# Patient Record
Sex: MALE | Race: WHITE | Employment: FULL TIME | URBAN - METROPOLITAN AREA
[De-identification: names, ages, dates, MRNs, and addresses within clinical notes are randomized per-mention and may not be internally consistent; named-entity substitution may affect disease eponyms.]

---

## 2017-01-09 ENCOUNTER — OFFICE VISIT (OUTPATIENT)
Dept: FAMILY MEDICINE CLINIC | Age: 22
End: 2017-01-09

## 2017-01-09 VITALS — WEIGHT: 143 LBS | BODY MASS INDEX: 19.39 KG/M2 | DIASTOLIC BLOOD PRESSURE: 70 MMHG | SYSTOLIC BLOOD PRESSURE: 110 MMHG

## 2017-01-09 DIAGNOSIS — Z23 NEED FOR INFLUENZA VACCINATION: ICD-10-CM

## 2017-01-09 DIAGNOSIS — M75.80 ROTATOR CUFF TENDONITIS, UNSPECIFIED LATERALITY: Primary | ICD-10-CM

## 2017-01-09 DIAGNOSIS — Z00.00 PREVENTATIVE HEALTH CARE: ICD-10-CM

## 2017-01-09 PROCEDURE — 99213 OFFICE O/P EST LOW 20 MIN: CPT | Performed by: FAMILY MEDICINE

## 2017-01-09 RX ORDER — DICLOFENAC SODIUM 75 MG/1
75 TABLET, DELAYED RELEASE ORAL 2 TIMES DAILY
Qty: 60 TABLET | Refills: 1 | Status: SHIPPED | OUTPATIENT
Start: 2017-01-09 | End: 2017-04-25

## 2017-04-25 ENCOUNTER — OFFICE VISIT (OUTPATIENT)
Dept: FAMILY MEDICINE CLINIC | Age: 22
End: 2017-04-25

## 2017-04-25 VITALS
WEIGHT: 145 LBS | HEIGHT: 72 IN | BODY MASS INDEX: 19.64 KG/M2 | DIASTOLIC BLOOD PRESSURE: 78 MMHG | SYSTOLIC BLOOD PRESSURE: 118 MMHG

## 2017-04-25 DIAGNOSIS — D22.9 MULTIPLE BENIGN NEVI: ICD-10-CM

## 2017-04-25 DIAGNOSIS — M22.2X1 RIGHT PATELLOFEMORAL SYNDROME: Primary | ICD-10-CM

## 2017-04-25 PROCEDURE — 99213 OFFICE O/P EST LOW 20 MIN: CPT | Performed by: FAMILY MEDICINE

## 2017-04-25 RX ORDER — DICLOFENAC SODIUM 75 MG/1
75 TABLET, DELAYED RELEASE ORAL 2 TIMES DAILY
Qty: 60 TABLET | Refills: 1 | Status: SHIPPED | OUTPATIENT
Start: 2017-04-25

## 2017-05-25 ENCOUNTER — TELEPHONE (OUTPATIENT)
Dept: FAMILY MEDICINE CLINIC | Age: 22
End: 2017-05-25

## 2017-05-25 RX ORDER — DIAZEPAM 2 MG/1
TABLET ORAL
Qty: 2 TABLET | Refills: 0 | Status: SHIPPED | OUTPATIENT
Start: 2017-05-25

## 2017-06-02 ENCOUNTER — PROCEDURE VISIT (OUTPATIENT)
Dept: DERMATOLOGY | Age: 22
End: 2017-06-02

## 2017-06-02 DIAGNOSIS — D22.4 IRRITATED NEVUS OF SCALP: Primary | ICD-10-CM

## 2017-06-02 PROCEDURE — 11306 SHAVE SKIN LESION 0.6-1.0 CM: CPT | Performed by: DERMATOLOGY

## 2017-06-12 ENCOUNTER — TELEPHONE (OUTPATIENT)
Dept: DERMATOLOGY | Age: 22
End: 2017-06-12

## 2017-07-11 ENCOUNTER — TELEPHONE (OUTPATIENT)
Dept: FAMILY MEDICINE CLINIC | Age: 22
End: 2017-07-11

## 2017-07-12 ENCOUNTER — TELEPHONE (OUTPATIENT)
Dept: FAMILY MEDICINE CLINIC | Age: 22
End: 2017-07-12

## 2017-07-12 DIAGNOSIS — B35.1 TINEA UNGUIUM: ICD-10-CM

## 2017-07-12 RX ORDER — TERBINAFINE HYDROCHLORIDE 250 MG/1
250 TABLET ORAL DAILY
Qty: 30 TABLET | Refills: 0 | Status: SHIPPED | OUTPATIENT
Start: 2017-07-12 | End: 2018-04-16 | Stop reason: SDUPTHER

## 2018-02-15 ENCOUNTER — OFFICE VISIT (OUTPATIENT)
Dept: FAMILY MEDICINE CLINIC | Age: 23
End: 2018-02-15

## 2018-02-15 ENCOUNTER — HOSPITAL ENCOUNTER (OUTPATIENT)
Dept: OTHER | Age: 23
Discharge: HOME | End: 2018-02-15
Attending: FAMILY MEDICINE | Admitting: FAMILY MEDICINE

## 2018-02-15 VITALS
SYSTOLIC BLOOD PRESSURE: 110 MMHG | BODY MASS INDEX: 19.02 KG/M2 | WEIGHT: 140.4 LBS | DIASTOLIC BLOOD PRESSURE: 70 MMHG | HEIGHT: 72 IN

## 2018-02-15 DIAGNOSIS — G89.29 CHRONIC BILATERAL LOW BACK PAIN WITHOUT SCIATICA: Primary | ICD-10-CM

## 2018-02-15 DIAGNOSIS — G89.29 CHRONIC BILATERAL LOW BACK PAIN WITHOUT SCIATICA: ICD-10-CM

## 2018-02-15 DIAGNOSIS — M54.50 CHRONIC BILATERAL LOW BACK PAIN WITHOUT SCIATICA: ICD-10-CM

## 2018-02-15 DIAGNOSIS — I73.00 RAYNAUD'S DISEASE WITHOUT GANGRENE: ICD-10-CM

## 2018-02-15 DIAGNOSIS — M54.50 CHRONIC BILATERAL LOW BACK PAIN WITHOUT SCIATICA: Primary | ICD-10-CM

## 2018-02-15 PROCEDURE — 99213 OFFICE O/P EST LOW 20 MIN: CPT | Performed by: FAMILY MEDICINE

## 2018-02-15 RX ORDER — AMLODIPINE BESYLATE 2.5 MG/1
2.5 TABLET ORAL DAILY
Qty: 30 TABLET | Refills: 3 | Status: SHIPPED | OUTPATIENT
Start: 2018-02-15

## 2018-02-15 ASSESSMENT — ENCOUNTER SYMPTOMS
ABDOMINAL PAIN: 0
EYE PAIN: 0
WHEEZING: 0
BACK PAIN: 1
SHORTNESS OF BREATH: 0
COUGH: 0

## 2018-02-15 ASSESSMENT — PATIENT HEALTH QUESTIONNAIRE - PHQ9
1. LITTLE INTEREST OR PLEASURE IN DOING THINGS: 0
2. FEELING DOWN, DEPRESSED OR HOPELESS: 0
SUM OF ALL RESPONSES TO PHQ QUESTIONS 1-9: 0
SUM OF ALL RESPONSES TO PHQ9 QUESTIONS 1 & 2: 0

## 2018-02-15 NOTE — PROGRESS NOTES
Subjective:      Patient ID: Guzman Wilson is a 23 y.o. male. HPI  Persistent mid to lower back pain  Onset years  Been nagging issue   no injury   tight muscles   gait ok   no leg pain or numbness  No falls    Also complains hands turn red white and blue when exposed to cold enviroments    Worse winter of holding cold objects   resolves with warmth  Nonsmoker   mod caffeine        Review of Systems   Constitutional: Negative for appetite change, fatigue and unexpected weight change. Eyes: Negative for pain and visual disturbance. Respiratory: Negative for cough, shortness of breath and wheezing. Cardiovascular: Negative for chest pain, palpitations and leg swelling. Gastrointestinal: Negative for abdominal pain. Endocrine: Negative for polyuria. Genitourinary: Negative for difficulty urinating. Musculoskeletal: Positive for back pain. Neurological: Negative for speech difficulty, numbness and headaches. Psychiatric/Behavioral: Negative for confusion and sleep disturbance. A complete 14 point  review of systems was completed; pertinent positives are noted in HPI    Vitals:    02/15/18 0928   BP: 110/70   Weight: 140 lb 6.4 oz (63.7 kg)   Height: 6' (1.829 m)     Body mass index is 19.04 kg/m². Wt Readings from Last 3 Encounters:   02/15/18 140 lb 6.4 oz (63.7 kg)   04/25/17 145 lb (65.8 kg) (33 %, Z= -0.43)*   01/09/17 143 lb (64.9 kg) (31 %, Z= -0.49)*     * Growth percentiles are based on [facility] 2-20 Years data. BP Readings from Last 3 Encounters:   02/15/18 110/70   04/25/17 118/78   01/09/17 110/70        /70   Ht 6' (1.829 m)   Wt 140 lb 6.4 oz (63.7 kg)   BMI 19.04 kg/m²     Objective:   Physical Exam   Constitutional: He is oriented to person, place, and time. No distress. Eyes: Conjunctivae are normal.   Cardiovascular: Normal rate and normal heart sounds. Pulmonary/Chest: Effort normal and breath sounds normal.   Abdominal: Soft.    Musculoskeletal:   General mild paraspinal tender thoracic anc lumbar areas  markedly tight hamstring   neg straight leg   neg sylvia  dtr 2-3/4 bilat extrem  Strength sym 5/5  Gait normal    No digit blanching or cyanosis     Neurological: He is alert and oriented to person, place, and time. Assessment:      Assessment/Plan:  Elyssa Rincon was seen today for back pain. Diagnoses and all orders for this visit:    Chronic bilateral low back pain without sciatica  -     XR LUMBAR SPINE (MIN 4 VIEWS)  -     XR THORACIC SPINE (2 VIEWS)    Raynaud's disease without gangrene  -     amLODIPine (NORVASC) 2.5 MG tablet;  Take 1 tablet by mouth daily            Plan:      Cold enviroment precaujtions   never smoke    Try norvasc  Likely need PT for chronic back pain   no clinical evidenc of cord or nerve root entrapment  Spent 30 minutes with patient and/or family in which greater than half the time was focused on counseling and educating patient and/or  family regarding current health isues

## 2018-02-19 ENCOUNTER — TELEPHONE (OUTPATIENT)
Dept: FAMILY MEDICINE CLINIC | Age: 23
End: 2018-02-19

## 2018-02-19 DIAGNOSIS — M54.6 THORACIC SPINE PAIN: ICD-10-CM

## 2018-02-19 DIAGNOSIS — M54.50 LUMBAR SPINE PAIN: Primary | ICD-10-CM

## 2018-04-16 ENCOUNTER — TELEPHONE (OUTPATIENT)
Dept: FAMILY MEDICINE CLINIC | Age: 23
End: 2018-04-16

## 2018-04-16 DIAGNOSIS — B35.1 TINEA UNGUIUM: ICD-10-CM

## 2018-04-16 RX ORDER — TERBINAFINE HYDROCHLORIDE 250 MG/1
250 TABLET ORAL DAILY
Qty: 30 TABLET | Refills: 0 | Status: SHIPPED | OUTPATIENT
Start: 2018-04-16

## 2019-05-01 ENCOUNTER — TELEPHONE (OUTPATIENT)
Dept: FAMILY MEDICINE CLINIC | Age: 24
End: 2019-05-01

## 2019-05-01 DIAGNOSIS — R42 VERTIGO: Primary | ICD-10-CM

## 2019-05-01 NOTE — TELEPHONE ENCOUNTER
Per Chana Salazar, patient's father on hippa, patient is out in [place] and has been dealing with severe Vertigo issues. States he has been seen by an ENT and had testing/MRI. The ENT recommended for patient to return to [place] and be seen by Neurology until they can figure out what is going on. Kt Saldivar is requesting for a referral to [facility] to be placed. Clarence aware Dr. Patricia Lam is out of the office today. Please return call.

## 2020-04-16 ENCOUNTER — VIRTUAL VISIT (OUTPATIENT)
Dept: FAMILY MEDICINE CLINIC | Age: 25
End: 2020-04-16
Payer: COMMERCIAL

## 2020-04-16 PROCEDURE — 99213 OFFICE O/P EST LOW 20 MIN: CPT | Performed by: FAMILY MEDICINE

## 2020-04-16 RX ORDER — ALBUTEROL SULFATE 90 UG/1
2 AEROSOL, METERED RESPIRATORY (INHALATION) EVERY 6 HOURS PRN
Qty: 1 INHALER | Refills: 3 | Status: SHIPPED | OUTPATIENT
Start: 2020-04-16

## 2020-04-16 ASSESSMENT — ENCOUNTER SYMPTOMS
WHEEZING: 1
SHORTNESS OF BREATH: 1
CHEST TIGHTNESS: 1

## 2020-04-16 NOTE — PROGRESS NOTES
exercise induced asthma lennie in higherr elevations   do not smoke   trial albuterl inhaler  TELEHEALTH EVALUATION -- Audio/Visual (During YFLKT-03 public health emergency)     Pursuant to the emergency declaration under the [address] [place] [address], [facility] waiver authority and the [employer] Act, this Virtual  Visit was conducted, with patient's consent, to reduce the patient's risk of exposure to COVID-19 and provide continuity of care for an established patient. Services were provided through a video synchronous discussion virtually to substitute for in-person clinic visit.             [address] [facility], [place]